# Patient Record
Sex: FEMALE | Race: ASIAN | NOT HISPANIC OR LATINO | Employment: FULL TIME | URBAN - METROPOLITAN AREA
[De-identification: names, ages, dates, MRNs, and addresses within clinical notes are randomized per-mention and may not be internally consistent; named-entity substitution may affect disease eponyms.]

---

## 2017-01-20 ENCOUNTER — HOSPITAL ENCOUNTER (EMERGENCY)
Facility: CLINIC | Age: 42
Discharge: HOME | End: 2017-01-20
Attending: EMERGENCY MEDICINE | Admitting: EMERGENCY MEDICINE
Payer: COMMERCIAL

## 2017-01-20 ENCOUNTER — APPOINTMENT (OUTPATIENT)
Dept: GENERAL RADIOLOGY | Facility: CLINIC | Age: 42
End: 2017-01-20
Attending: EMERGENCY MEDICINE
Payer: COMMERCIAL

## 2017-01-20 VITALS
TEMPERATURE: 97.8 F | RESPIRATION RATE: 16 BRPM | OXYGEN SATURATION: 100 % | DIASTOLIC BLOOD PRESSURE: 84 MMHG | HEART RATE: 100 BPM | SYSTOLIC BLOOD PRESSURE: 122 MMHG

## 2017-01-20 DIAGNOSIS — S20.219A CHEST WALL CONTUSION, UNSPECIFIED LATERALITY, INITIAL ENCOUNTER: ICD-10-CM

## 2017-01-20 DIAGNOSIS — S80.11XA CONTUSION OF RIGHT LOWER LEG, INITIAL ENCOUNTER: ICD-10-CM

## 2017-01-20 DIAGNOSIS — R07.89 CHEST WALL PAIN: ICD-10-CM
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
## 2017-01-20 PROCEDURE — 71020 XR CHEST 2 VW: CPT

## 2017-01-20 PROCEDURE — 99284 EMERGENCY DEPT VISIT MOD MDM: CPT | Mod: 25

## 2017-01-20 PROCEDURE — 25000132 ZZH RX MED GY IP 250 OP 250 PS 637: Performed by: EMERGENCY MEDICINE

## 2017-01-20 PROCEDURE — 93005 ELECTROCARDIOGRAM TRACING: CPT

## 2017-01-20 RX ORDER — IBUPROFEN 200 MG
600 TABLET ORAL EVERY 8 HOURS PRN
Qty: 1 TABLET | Refills: 0 | Status: ON HOLD | OUTPATIENT
Start: 2017-01-20 | End: 2019-09-04

## 2017-01-20 RX ORDER — OXYCODONE HYDROCHLORIDE 5 MG/1
5 TABLET ORAL ONCE
Status: COMPLETED | OUTPATIENT
Start: 2017-01-20 | End: 2017-01-20

## 2017-01-20 RX ORDER — ACETAMINOPHEN 500 MG
1000 TABLET ORAL ONCE
Status: COMPLETED | OUTPATIENT
Start: 2017-01-20 | End: 2017-01-20

## 2017-01-20 RX ORDER — HYDROCODONE BITARTRATE AND ACETAMINOPHEN 5; 325 MG/1; MG/1
1-2 TABLET ORAL EVERY 4 HOURS PRN
Qty: 15 TABLET | Refills: 0 | Status: ON HOLD | OUTPATIENT
Start: 2017-01-20 | End: 2019-09-04

## 2017-01-20 RX ADMIN — OXYCODONE HYDROCHLORIDE 5 MG: 5 TABLET ORAL at 17:49

## 2017-01-20 RX ADMIN — ACETAMINOPHEN 1000 MG: 500 TABLET, FILM COATED ORAL at 17:49

## 2017-01-20 ASSESSMENT — ENCOUNTER SYMPTOMS
SHORTNESS OF BREATH: 0
ABDOMINAL PAIN: 0
NECK PAIN: 0
HEADACHES: 0
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
## 2017-01-20 NOTE — ED AVS SNAPSHOT
[facility] Emergency Department    [address]    [facility] [address]    Phone:  [phone]    Fax:  [phone]                                       Cheryl Lester   MRN: [id number]    Department:  [facility] Emergency Department   Date of Visit:  1/20/2017           After Visit Summary Signature Page     I have received my discharge instructions, and my questions have been answered. I have discussed any challenges I see with this plan with the nurse or doctor.    ..........................................................................................................................................  Patient/Patient Representative Signature      ..........................................................................................................................................  Patient Representative Print Name and Relationship to Patient    ..................................................               ................................................  Date                                            Time    ..........................................................................................................................................  Reviewed by Signature/Title    ...................................................              ..............................................  Date                                                            Time

## 2017-01-21 NOTE — DISCHARGE INSTRUCTIONS
Chest Contusion    A contusion is a bruise to the skin, muscle, or ribs. It may cause pain, tenderness, and swelling. It may turn the skin purple until it heals. Contusions take a few days to a few weeks to heal.  Home care  Follow these guidelines when caring for yourself at home:    Rest. Don t do any heavy lifting or strenuous activity. Don t do any activity that causes pain.    Put an ice pack on the injured area. Do this for 20 minutes every 1 to 2 hours the first day. You can make an ice pack by wrapping a plastic bag of ice cubes in a thin towel. Continue to use the ice pack 3 to 4 times a day for the next 2 days. Then use the ice pack as needed to ease pain and swelling.    After 1 to 2 days you may put a warm compress on the area. Do this for 10 minutes several times a day. A warm compress is a clean cloth that s damp with warm water.    Hold a pillow to the affected area when you cough. This will help ease pain.    You may use acetaminophen or ibuprofen to control pain, unless another pain medicine was prescribed. If you have chronic liver or kidney disease, talk with your health care provider before using these medicines. Also talk with your provider if you ve had a stomach ulcer or GI bleeding.  Follow-up care  Follow up with your health care provider during the next week, or as advised.  When to seek medical advice  Call your health care provider right away if any of these occur:    Shortness of breath, difficulty breathing, or breathing fast    Chest pain gets worse when you breathe    Severe pain that comes on suddenly or lasts more than an hour    Dizziness, weakness, or fainting    New abdominal pain or abdominal pain that gets worse     Fever of 101 F (38.3 C) or higher, or as directed by your health care provider    2871-6534 [employer]. [address] [place] [address], [place] [address]. All rights reserved. This information is not intended as a substitute for professional medical care.  Always follow your healthcare professional's instructions.    Opioid Medication Discharge Instructions    You have been given a prescription for an opioid (narcotic) pain medicine and/or have   received a pain medicine while here in the emergency department. These medicines can make you drowsy or impaired.     You must not drive, operate dangerous equipment, or   engage in any other dangerous activities while taking these medications. If you drive while taking these medications, you could be arrested for DUI, or driving under the   influence. Do not drink any alcohol while you are taking these medications.     Opioid pain medications can cause addiction. If you have a history of chemical   dependency of any type, you are at a higher risk of becoming addicted to pain   medications. Only take these prescribed medications to treat your pain when all other   options have been tried. Take it for as short a time and as few doses as possible.     Store your pain pills in a secure place, as they are frequently stolen and provide a dangerous opportunity for children or visitors in your house to start abusing these powerful medications. We will not replace any lost or stolen medicine.     As soon as your pain is better, you should safely dispose of all your remaining medication.     Many prescription pain medications contain Tylenol (acetaminophen), including Vicodin, Tylenol #3, Norco, Lortab, and Percocet. You should not take any extra pills of Tylenol if you are using these prescription medications or you can get very sick. Do not ever take more than 4000 mg of acetaminophen in any 24 hour period.    All opioids tend to cause constipation. Drink plenty of water and eat foods that have   a lot of fiber, such as fruits, vegetables, prune juice, apple juice and high fiber cereal.   Take a laxative if you don t move your bowels at least every other day. Miralax, Milk of   Magnesia, Colace, or Senna can be used to keep you  regular.    Discharge Instructions  Trauma    You were seen today for an injury due to some kind of trauma (crash, fall, etc.).  Some injuries may not show up until after you leave the Emergency Department.  It is important that you pay attention to these instructions and follow-up with your regular doctor as instructed.    Return to the Emergency Department right away if:    You have abdominal pain or bruises, chest pain, pain in a new area, or pain that is getting worse.    You get short of breath.    You develop a fever over 101 degrees.    You have weakness in your arms or legs.    You faint or you are very lightheaded.    You have any new symptoms, you are feeling weak or unusually ill, or something worries you.     Injuries to the brain are possible with any accident.  Return right away if you have confusion, vomiting more than once, difficulty walking or a headache that is getting worse. Bring a child or a person who can t talk back if they seem to be behaving in an abnormal way.      MORE INFORMATION:    General Injuries:    Aches and pains are usually worse the day after your accident, but should not be severe, and should start getting better after that. Aches and pains are common in the neck and back.    Injuries from your accident may prevent you from working.  Follow-up with your regular doctor to get a work note and to find out how long you will not be able to work.    Pain medications or your injuries may make it unsafe for you to drive or operate machinery.    Use ice to injured areas for the first one or two days. Apply a bag of ice wrapped in a cloth for about 15 minutes at a time. You can do this as often as once an hour. Do not sleep with an ice pack, since it can burn you.     You can use non-prescription pain medicine, like Tylenol  (acetaminophen), Advil  (ibuprofen), Motrin  (ibuprofen), Nuprin  (ibuprofen) if your emergency doctor or your own doctor told you this is okay. Tylenol   (acetaminophen) is in many prescription medicines and non-prescription medicines--check all of your medicines to be sure you aren t taking more than 3000 mg per day.    Limit your activity for at least one or two days. Avoid doing things that hurt.    You need to see your doctor if any injured area is not back to normal in 1 week.    Car Accident:    If you have been on a backboard or had a neck collar on, this may make you stiff and sore.  This should get better in 1-2 days.  Return to the Emergency Department if the pain or discomfort is severe or gets worse.    Be careful of shards of glass on your body or in your belongings.    Fractures, Sprains, and Strains:    Return to the Emergency Department right away if your injured area gets more painful, if the splint or dressing seems to be too tight, if it gets numb or tingly past the injury, or if the area past the injury gets pale, blue, or cold.    Use your crutches if you were given them today. Don t put weight on the injured area until the pain is gone.    Keep the injured area above the level of your heart while laying or sitting down.  This well help lessen the swelling (puffiness) and the pain.    You may use an elastic bandage (Ace  Wrap) if it makes you more comfortable. Wrap it just tight enough to provide mild compression, and loosen it if you get swelling past the bandage.    Note about X-rays: If you had x-rays done today, they were read by your emergency physician. They will also be read later by a radiologist. We will contact you if the radiologist thinks they show something different than the emergency physician did. Remember that there are some fractures (breaks in the bone) that can t be seen right away. Even if your x-rays today were normal, you must see your doctor in clinic to re-check.     Splints:    A splint put on in the Emergency Department is temporary. Your regular doctor or orthopedic doctor will remove it, and replace it with a cast or  boot if needed.    Keep the splint dry. Cover it with a plastic bag when you wash. Even with a plastic bag, you still can t get in water or let water get right on it. If it does get wet, you should come back or see your doctor to have it replaced.    Do not put objects inside the splint to scratch.    If there is an elastic bandage (Ace  Wrap) holding the splint on this may be loosened a little to relieve pressure or pain.  If pain continues return to the Emergency Department right away.    Return if the splint starts cutting into your skin.    Do not remove your splint by yourself unless told to by your doctor. You can t take it off and put it back on again.     Wounds:    Infections can follow many injuries. Watch for fevers, redness spreading from the wound, pus or stitches that open up. Return here or see your doctor if these happen.    There can always be glass, wood, dirt or other things in any wound.  They won t always show up even on x-rays.  If a wound doesn t heal, this may be why, and it is important to follow-up with your regular doctor. Small pieces of glass or other materials may work their way out on their own.    Cuts or scrapes may start to bleed after leaving the Emergency Department.  If this happens, hold pressure on the bleeding area with a clean cloth or put pressure over the bandage.  If the bleeding doesn t stop after you use constant pressure for   hour, you should return to the Emergency Department for further treatment.    Any bandage or dressing put on here should be removed in 12-24 hours, or as your doctor instructs. Remove the dressing sooner if it seems too tight or painful, or if it is getting numb, tingly, or pale past the dressing.    After you take off the dressing, wash the cut or scrape with soap and water once or twice a day.    Apply ointment like Bacitracin  (polypeptide antibiotic) to scrapes or cuts, and keep them covered with a Band-Aid  or gauze if possible, until they  heal up or until your stitches are taken out.    Dermabond  or Steri-Strips  should be left alone and will come off by themselves.  Dissolving stitches should go away or fall out within about a week.    Regular stitches need to be taken out by your doctor in clinic.  Call today and schedule an appointment.  Leave your stitches in for as long as you were told today.    Most injuries are preventable!  As your local emergency physicians, we encourage you to:    Wear your seat belt.    Do not talk on your cell phone while driving.    Do not read or send text messages while driving.    Wear a bike or motorcycle helmet.    Wear a helmet while skiing and snowboarding.    Wear personal flotation devices at all times while on the water.    Always have your child in a car seat.    Do not allow children less than 12 years old to ride in the front seat.    Go to the CDC website to find more information on preventing injures:  http://www.cdc.gov/injury/index.html    If you were given a prescription for medicine here today, be sure to read all of the information (including the package insert) that comes with your prescription.  This will include important information about the medicine, its side effects, and any warnings that you need to know about.  The pharmacist who fills the prescription can provide more information and answer questions you may have about the medicine.  If you have questions or concerns that the pharmacist cannot address, please call or return to the Emergency Department.     Opioid Medication Information    Pain medications are among the most commonly prescribed medicines, so we are including this information for all our patients. If you did not receive pain medication or get a prescription for pain medicine, you can ignore it.     You may have been given a prescription for an opioid (narcotic) pain medicine and/or have received a pain medicine while here in the Emergency Department. These medicines can make  you drowsy or impaired. You must not drive, operate dangerous equipment, or engage in any other dangerous activities while taking these medications. If you drive while taking these medications, you could be arrested for DUI, or driving under the influence. Do not drink any alcohol while you are taking these medications.     Opioid pain medications can cause addiction. If you have a history of chemical dependency of any type, you are at a higher risk of becoming addicted to pain medications.  Only take these prescribed medications to treat your pain when all other options have been tried. Take it for as short a time and as few doses as possible. Store your pain pills in a secure place, as they are frequently stolen and provide a dangerous opportunity for children or visitors in your house to start abusing these powerful medications. We will not replace any lost or stolen medicine.  As soon as your pain is better, you should flush all your remaining medication.     Many prescription pain medications contain Tylenol  (acetaminophen), including Vicodin , Tylenol #3 , Norco , Lortab , and Percocet .  You should not take any extra pills of Tylenol  if you are using these prescription medications or you can get very sick.  Do not ever take more than 3000 mg of acetaminophen in any 24 hour period.    All opioids tend to cause constipation. Drink plenty of water and eat foods that have a lot of fiber, such as fruits, vegetables, prune juice, apple juice and high fiber cereal.  Take a laxative if you don t move your bowels at least every other day. Miralax , Milk of Magnesia, Colace , or Senna  can be used to keep you regular.      Remember that you can always come back to the Emergency Department if you are not able to see your regular doctor in the amount of time listed above, if you get any new symptoms, or if there is anything that worries you.

## 2017-01-23 LAB — INTERPRETATION ECG - MUSE: NORMAL

## 2019-05-07 ENCOUNTER — TRANSFERRED RECORDS (OUTPATIENT)
Dept: HEALTH INFORMATION MANAGEMENT | Facility: CLINIC | Age: 44
End: 2019-05-07

## 2019-07-31 ENCOUNTER — TRANSFERRED RECORDS (OUTPATIENT)
Dept: HEALTH INFORMATION MANAGEMENT | Facility: CLINIC | Age: 44
End: 2019-07-31

## 2019-09-03 ENCOUNTER — HOSPITAL ENCOUNTER (OUTPATIENT)
Facility: CLINIC | Age: 44
Discharge: HOME | End: 2019-09-04
Attending: OBSTETRICS & GYNECOLOGY | Admitting: OBSTETRICS & GYNECOLOGY
Payer: COMMERCIAL

## 2019-09-03 ENCOUNTER — OFFICE VISIT (OUTPATIENT)
Dept: INTERPRETER SERVICES | Facility: CLINIC | Age: 44
End: 2019-09-03
Payer: COMMERCIAL

## 2019-09-03 ENCOUNTER — ANESTHESIA (OUTPATIENT)
Dept: SURGERY | Facility: CLINIC | Age: 44
End: 2019-09-03
Payer: COMMERCIAL

## 2019-09-03 ENCOUNTER — ANESTHESIA EVENT (OUTPATIENT)
Dept: SURGERY | Facility: CLINIC | Age: 44
End: 2019-09-03
Payer: COMMERCIAL

## 2019-09-03 DIAGNOSIS — Z90.79 STATUS POST ABDOMINAL HYSTERECTOMY AND RIGHT SALPINGO-OOPHORECTOMY: Primary | ICD-10-CM

## 2019-09-03 DIAGNOSIS — Z90.721 STATUS POST ABDOMINAL HYSTERECTOMY AND RIGHT SALPINGO-OOPHORECTOMY: Primary | ICD-10-CM

## 2019-09-03 DIAGNOSIS — Z90.710 STATUS POST ABDOMINAL HYSTERECTOMY AND RIGHT SALPINGO-OOPHORECTOMY: Primary | ICD-10-CM

## 2019-09-03 PROBLEM — R52 PAIN: Status: ACTIVE | Noted: 2019-09-03

## 2019-09-03 LAB — HCG UR QL: NEGATIVE

## 2019-09-03 PROCEDURE — 00000155 ZZHCL STATISTIC H-CELL BLOCK W/STAIN: Performed by: OBSTETRICS & GYNECOLOGY

## 2019-09-03 PROCEDURE — 71000013 ZZH RECOVERY PHASE 1 LEVEL 1 EA ADDTL HR: Performed by: OBSTETRICS & GYNECOLOGY

## 2019-09-03 PROCEDURE — 88305 TISSUE EXAM BY PATHOLOGIST: CPT | Performed by: OBSTETRICS & GYNECOLOGY

## 2019-09-03 PROCEDURE — 25000125 ZZHC RX 250: Performed by: NURSE ANESTHETIST, CERTIFIED REGISTERED

## 2019-09-03 PROCEDURE — 88307 TISSUE EXAM BY PATHOLOGIST: CPT | Performed by: OBSTETRICS & GYNECOLOGY

## 2019-09-03 PROCEDURE — 37000008 ZZH ANESTHESIA TECHNICAL FEE, 1ST 30 MIN: Performed by: OBSTETRICS & GYNECOLOGY

## 2019-09-03 PROCEDURE — 88112 CYTOPATH CELL ENHANCE TECH: CPT | Performed by: OBSTETRICS & GYNECOLOGY

## 2019-09-03 PROCEDURE — 25800030 ZZH RX IP 258 OP 636: Performed by: ANESTHESIOLOGY

## 2019-09-03 PROCEDURE — 88112 CYTOPATH CELL ENHANCE TECH: CPT | Mod: 26 | Performed by: OBSTETRICS & GYNECOLOGY

## 2019-09-03 PROCEDURE — 25000132 ZZH RX MED GY IP 250 OP 250 PS 637: Performed by: ANESTHESIOLOGY

## 2019-09-03 PROCEDURE — 88307 TISSUE EXAM BY PATHOLOGIST: CPT | Mod: 26 | Performed by: OBSTETRICS & GYNECOLOGY

## 2019-09-03 PROCEDURE — 25000125 ZZHC RX 250: Performed by: OBSTETRICS & GYNECOLOGY

## 2019-09-03 PROCEDURE — 81025 URINE PREGNANCY TEST: CPT | Performed by: ANESTHESIOLOGY

## 2019-09-03 PROCEDURE — 40000306 ZZH STATISTIC PRE PROC ASSESS II: Performed by: OBSTETRICS & GYNECOLOGY

## 2019-09-03 PROCEDURE — 37000009 ZZH ANESTHESIA TECHNICAL FEE, EACH ADDTL 15 MIN: Performed by: OBSTETRICS & GYNECOLOGY

## 2019-09-03 PROCEDURE — 71000012 ZZH RECOVERY PHASE 1 LEVEL 1 FIRST HR: Performed by: OBSTETRICS & GYNECOLOGY

## 2019-09-03 PROCEDURE — 27210794 ZZH OR GENERAL SUPPLY STERILE: Performed by: OBSTETRICS & GYNECOLOGY

## 2019-09-03 PROCEDURE — 25000128 H RX IP 250 OP 636: Performed by: NURSE ANESTHETIST, CERTIFIED REGISTERED

## 2019-09-03 PROCEDURE — 88305 TISSUE EXAM BY PATHOLOGIST: CPT | Mod: 26 | Performed by: OBSTETRICS & GYNECOLOGY

## 2019-09-03 PROCEDURE — 25000128 H RX IP 250 OP 636: Performed by: OBSTETRICS & GYNECOLOGY

## 2019-09-03 PROCEDURE — 36000063 ZZH SURGERY LEVEL 4 EA 15 ADDTL MIN: Performed by: OBSTETRICS & GYNECOLOGY

## 2019-09-03 PROCEDURE — 36000093 ZZH SURGERY LEVEL 4 1ST 30 MIN: Performed by: OBSTETRICS & GYNECOLOGY

## 2019-09-03 PROCEDURE — 25000128 H RX IP 250 OP 636: Performed by: ANESTHESIOLOGY

## 2019-09-03 PROCEDURE — 25800025 ZZH RX 258: Performed by: OBSTETRICS & GYNECOLOGY

## 2019-09-03 PROCEDURE — 25800030 ZZH RX IP 258 OP 636: Performed by: NURSE ANESTHETIST, CERTIFIED REGISTERED

## 2019-09-03 PROCEDURE — 25800030 ZZH RX IP 258 OP 636: Performed by: OBSTETRICS & GYNECOLOGY

## 2019-09-03 PROCEDURE — 25000125 ZZHC RX 250: Performed by: ANESTHESIOLOGY

## 2019-09-03 PROCEDURE — T1013 SIGN LANG/ORAL INTERPRETER: HCPCS | Mod: U3

## 2019-09-03 RX ORDER — ONDANSETRON 4 MG/1
4 TABLET, ORALLY DISINTEGRATING ORAL EVERY 6 HOURS PRN
Status: DISCONTINUED | OUTPATIENT
Start: 2019-09-03 | End: 2019-09-04 | Stop reason: HOSPADM

## 2019-09-03 RX ORDER — ACETAMINOPHEN 325 MG/1
650 TABLET ORAL EVERY 6 HOURS PRN
Status: DISCONTINUED | OUTPATIENT
Start: 2019-09-03 | End: 2019-09-04 | Stop reason: HOSPADM

## 2019-09-03 RX ORDER — METOCLOPRAMIDE HYDROCHLORIDE 5 MG/ML
10 INJECTION INTRAMUSCULAR; INTRAVENOUS EVERY 6 HOURS PRN
Status: DISCONTINUED | OUTPATIENT
Start: 2019-09-03 | End: 2019-09-04 | Stop reason: HOSPADM

## 2019-09-03 RX ORDER — MEPERIDINE HYDROCHLORIDE 25 MG/ML
12.5 INJECTION INTRAMUSCULAR; INTRAVENOUS; SUBCUTANEOUS
Status: DISCONTINUED | OUTPATIENT
Start: 2019-09-03 | End: 2019-09-03 | Stop reason: HOSPADM

## 2019-09-03 RX ORDER — HYDROMORPHONE HYDROCHLORIDE 1 MG/ML
.3-.5 INJECTION, SOLUTION INTRAMUSCULAR; INTRAVENOUS; SUBCUTANEOUS
Status: DISCONTINUED | OUTPATIENT
Start: 2019-09-03 | End: 2019-09-04 | Stop reason: HOSPADM

## 2019-09-03 RX ORDER — ACETAMINOPHEN 325 MG/1
975 TABLET ORAL ONCE
Status: COMPLETED | OUTPATIENT
Start: 2019-09-03 | End: 2019-09-03

## 2019-09-03 RX ORDER — FENTANYL CITRATE 50 UG/ML
25-50 INJECTION, SOLUTION INTRAMUSCULAR; INTRAVENOUS EVERY 5 MIN PRN
Status: DISCONTINUED | OUTPATIENT
Start: 2019-09-03 | End: 2019-09-03 | Stop reason: HOSPADM

## 2019-09-03 RX ORDER — NALOXONE HYDROCHLORIDE 0.4 MG/ML
.1-.4 INJECTION, SOLUTION INTRAMUSCULAR; INTRAVENOUS; SUBCUTANEOUS
Status: DISCONTINUED | OUTPATIENT
Start: 2019-09-03 | End: 2019-09-03 | Stop reason: HOSPADM

## 2019-09-03 RX ORDER — IBUPROFEN 600 MG/1
600 TABLET, FILM COATED ORAL EVERY 6 HOURS PRN
Status: DISCONTINUED | OUTPATIENT
Start: 2019-09-04 | End: 2019-09-04 | Stop reason: HOSPADM

## 2019-09-03 RX ORDER — LIDOCAINE 40 MG/G
CREAM TOPICAL
Status: DISCONTINUED | OUTPATIENT
Start: 2019-09-03 | End: 2019-09-03 | Stop reason: HOSPADM

## 2019-09-03 RX ORDER — CEFAZOLIN SODIUM 1 G/3ML
1 INJECTION, POWDER, FOR SOLUTION INTRAMUSCULAR; INTRAVENOUS SEE ADMIN INSTRUCTIONS
Status: DISCONTINUED | OUTPATIENT
Start: 2019-09-03 | End: 2019-09-03 | Stop reason: HOSPADM

## 2019-09-03 RX ORDER — ONDANSETRON 2 MG/ML
4 INJECTION INTRAMUSCULAR; INTRAVENOUS EVERY 6 HOURS PRN
Status: DISCONTINUED | OUTPATIENT
Start: 2019-09-03 | End: 2019-09-04 | Stop reason: HOSPADM

## 2019-09-03 RX ORDER — PROPOFOL 10 MG/ML
INJECTION, EMULSION INTRAVENOUS PRN
Status: DISCONTINUED | OUTPATIENT
Start: 2019-09-03 | End: 2019-09-03

## 2019-09-03 RX ORDER — CELECOXIB 200 MG/1
200 CAPSULE ORAL ONCE
Status: COMPLETED | OUTPATIENT
Start: 2019-09-03 | End: 2019-09-03

## 2019-09-03 RX ORDER — KETOROLAC TROMETHAMINE 30 MG/ML
30 INJECTION, SOLUTION INTRAMUSCULAR; INTRAVENOUS EVERY 6 HOURS
Status: DISCONTINUED | OUTPATIENT
Start: 2019-09-03 | End: 2019-09-04 | Stop reason: HOSPADM

## 2019-09-03 RX ORDER — OXYCODONE HYDROCHLORIDE 5 MG/1
5-10 TABLET ORAL
Status: DISCONTINUED | OUTPATIENT
Start: 2019-09-03 | End: 2019-09-04 | Stop reason: HOSPADM

## 2019-09-03 RX ORDER — GLYCOPYRROLATE 0.2 MG/ML
INJECTION, SOLUTION INTRAMUSCULAR; INTRAVENOUS PRN
Status: DISCONTINUED | OUTPATIENT
Start: 2019-09-03 | End: 2019-09-03

## 2019-09-03 RX ORDER — EPHEDRINE SULFATE 50 MG/ML
25 INJECTION, SOLUTION INTRAVENOUS ONCE
Status: COMPLETED | OUTPATIENT
Start: 2019-09-03 | End: 2019-09-03

## 2019-09-03 RX ORDER — SODIUM CHLORIDE, SODIUM LACTATE, POTASSIUM CHLORIDE, CALCIUM CHLORIDE 600; 310; 30; 20 MG/100ML; MG/100ML; MG/100ML; MG/100ML
INJECTION, SOLUTION INTRAVENOUS CONTINUOUS
Status: DISCONTINUED | OUTPATIENT
Start: 2019-09-03 | End: 2019-09-04 | Stop reason: HOSPADM

## 2019-09-03 RX ORDER — ONDANSETRON 4 MG/1
4 TABLET, ORALLY DISINTEGRATING ORAL EVERY 30 MIN PRN
Status: DISCONTINUED | OUTPATIENT
Start: 2019-09-03 | End: 2019-09-03 | Stop reason: HOSPADM

## 2019-09-03 RX ORDER — NEOSTIGMINE METHYLSULFATE 1 MG/ML
VIAL (ML) INJECTION PRN
Status: DISCONTINUED | OUTPATIENT
Start: 2019-09-03 | End: 2019-09-03

## 2019-09-03 RX ORDER — SODIUM CHLORIDE, SODIUM LACTATE, POTASSIUM CHLORIDE, CALCIUM CHLORIDE 600; 310; 30; 20 MG/100ML; MG/100ML; MG/100ML; MG/100ML
INJECTION, SOLUTION INTRAVENOUS CONTINUOUS
Status: DISCONTINUED | OUTPATIENT
Start: 2019-09-03 | End: 2019-09-03 | Stop reason: HOSPADM

## 2019-09-03 RX ORDER — NALOXONE HYDROCHLORIDE 0.4 MG/ML
.1-.4 INJECTION, SOLUTION INTRAMUSCULAR; INTRAVENOUS; SUBCUTANEOUS
Status: DISCONTINUED | OUTPATIENT
Start: 2019-09-03 | End: 2019-09-04 | Stop reason: HOSPADM

## 2019-09-03 RX ORDER — PROMETHAZINE HYDROCHLORIDE 25 MG/ML
25 INJECTION INTRAMUSCULAR; INTRAVENOUS ONCE
Status: COMPLETED | OUTPATIENT
Start: 2019-09-03 | End: 2019-09-03

## 2019-09-03 RX ORDER — EPHEDRINE SULFATE 50 MG/ML
INJECTION, SOLUTION INTRAMUSCULAR; INTRAVENOUS; SUBCUTANEOUS PRN
Status: DISCONTINUED | OUTPATIENT
Start: 2019-09-03 | End: 2019-09-03

## 2019-09-03 RX ORDER — BUPIVACAINE HYDROCHLORIDE AND EPINEPHRINE 2.5; 5 MG/ML; UG/ML
INJECTION, SOLUTION EPIDURAL; INFILTRATION; INTRACAUDAL; PERINEURAL PRN
Status: DISCONTINUED | OUTPATIENT
Start: 2019-09-03 | End: 2019-09-03 | Stop reason: HOSPADM

## 2019-09-03 RX ORDER — CEFAZOLIN SODIUM 2 G/100ML
2 INJECTION, SOLUTION INTRAVENOUS
Status: COMPLETED | OUTPATIENT
Start: 2019-09-03 | End: 2019-09-03

## 2019-09-03 RX ORDER — KETAMINE HYDROCHLORIDE 10 MG/ML
INJECTION INTRAMUSCULAR; INTRAVENOUS PRN
Status: DISCONTINUED | OUTPATIENT
Start: 2019-09-03 | End: 2019-09-03

## 2019-09-03 RX ORDER — ONDANSETRON 2 MG/ML
4 INJECTION INTRAMUSCULAR; INTRAVENOUS EVERY 30 MIN PRN
Status: DISCONTINUED | OUTPATIENT
Start: 2019-09-03 | End: 2019-09-03 | Stop reason: HOSPADM

## 2019-09-03 RX ORDER — ONDANSETRON 2 MG/ML
INJECTION INTRAMUSCULAR; INTRAVENOUS PRN
Status: DISCONTINUED | OUTPATIENT
Start: 2019-09-03 | End: 2019-09-03

## 2019-09-03 RX ORDER — LIDOCAINE HYDROCHLORIDE 10 MG/ML
INJECTION, SOLUTION INFILTRATION; PERINEURAL PRN
Status: DISCONTINUED | OUTPATIENT
Start: 2019-09-03 | End: 2019-09-03

## 2019-09-03 RX ORDER — FENTANYL CITRATE 50 UG/ML
INJECTION, SOLUTION INTRAMUSCULAR; INTRAVENOUS PRN
Status: DISCONTINUED | OUTPATIENT
Start: 2019-09-03 | End: 2019-09-03

## 2019-09-03 RX ORDER — DEXAMETHASONE SODIUM PHOSPHATE 4 MG/ML
INJECTION, SOLUTION INTRA-ARTICULAR; INTRALESIONAL; INTRAMUSCULAR; INTRAVENOUS; SOFT TISSUE PRN
Status: DISCONTINUED | OUTPATIENT
Start: 2019-09-03 | End: 2019-09-03

## 2019-09-03 RX ORDER — LIDOCAINE 40 MG/G
CREAM TOPICAL
Status: DISCONTINUED | OUTPATIENT
Start: 2019-09-03 | End: 2019-09-04 | Stop reason: HOSPADM

## 2019-09-03 RX ORDER — METOCLOPRAMIDE 5 MG/1
10 TABLET ORAL EVERY 6 HOURS PRN
Status: DISCONTINUED | OUTPATIENT
Start: 2019-09-03 | End: 2019-09-04 | Stop reason: HOSPADM

## 2019-09-03 RX ORDER — MINERAL OIL
OIL (ML) MISCELLANEOUS PRN
Status: DISCONTINUED | OUTPATIENT
Start: 2019-09-03 | End: 2019-09-03 | Stop reason: HOSPADM

## 2019-09-03 RX ORDER — FENTANYL CITRATE 50 UG/ML
25-50 INJECTION, SOLUTION INTRAMUSCULAR; INTRAVENOUS
Status: DISCONTINUED | OUTPATIENT
Start: 2019-09-03 | End: 2019-09-03 | Stop reason: HOSPADM

## 2019-09-03 RX ADMIN — CELECOXIB 200 MG: 200 CAPSULE ORAL at 07:10

## 2019-09-03 RX ADMIN — Medication 30 MG: at 07:41

## 2019-09-03 RX ADMIN — MIDAZOLAM 2 MG: 1 INJECTION INTRAMUSCULAR; INTRAVENOUS at 07:35

## 2019-09-03 RX ADMIN — Medication 10 MG: at 10:01

## 2019-09-03 RX ADMIN — ROCURONIUM BROMIDE 20 MG: 10 INJECTION INTRAVENOUS at 09:34

## 2019-09-03 RX ADMIN — FENTANYL CITRATE 50 MCG: 50 INJECTION, SOLUTION INTRAMUSCULAR; INTRAVENOUS at 10:54

## 2019-09-03 RX ADMIN — ONDANSETRON 4 MG: 2 INJECTION INTRAMUSCULAR; INTRAVENOUS at 11:31

## 2019-09-03 RX ADMIN — ROCURONIUM BROMIDE 10 MG: 10 INJECTION INTRAVENOUS at 08:01

## 2019-09-03 RX ADMIN — EPHEDRINE SULFATE 25 MG: 50 INJECTION, SOLUTION INTRAVENOUS at 12:07

## 2019-09-03 RX ADMIN — CEFAZOLIN 1 G: 1 INJECTION, POWDER, FOR SOLUTION INTRAMUSCULAR; INTRAVENOUS at 09:45

## 2019-09-03 RX ADMIN — GLYCOPYRROLATE 0.6 MG: 0.2 INJECTION, SOLUTION INTRAMUSCULAR; INTRAVENOUS at 10:53

## 2019-09-03 RX ADMIN — DEXMEDETOMIDINE HYDROCHLORIDE 0.5 MCG/KG/HR: 100 INJECTION, SOLUTION INTRAVENOUS at 07:50

## 2019-09-03 RX ADMIN — Medication 10 MG: at 07:58

## 2019-09-03 RX ADMIN — KETOROLAC TROMETHAMINE 30 MG: 30 INJECTION, SOLUTION INTRAMUSCULAR at 20:26

## 2019-09-03 RX ADMIN — ROCURONIUM BROMIDE 20 MG: 10 INJECTION INTRAVENOUS at 08:58

## 2019-09-03 RX ADMIN — Medication 5 MG: at 09:21

## 2019-09-03 RX ADMIN — FENTANYL CITRATE 50 MCG: 50 INJECTION, SOLUTION INTRAMUSCULAR; INTRAVENOUS at 10:20

## 2019-09-03 RX ADMIN — FENTANYL CITRATE 50 MCG: 50 INJECTION, SOLUTION INTRAMUSCULAR; INTRAVENOUS at 07:43

## 2019-09-03 RX ADMIN — PROPOFOL 80 MG: 10 INJECTION, EMULSION INTRAVENOUS at 07:41

## 2019-09-03 RX ADMIN — SODIUM CHLORIDE, POTASSIUM CHLORIDE, SODIUM LACTATE AND CALCIUM CHLORIDE: 600; 310; 30; 20 INJECTION, SOLUTION INTRAVENOUS at 07:14

## 2019-09-03 RX ADMIN — PHENYLEPHRINE HYDROCHLORIDE 100 MCG: 10 INJECTION INTRAVENOUS at 10:00

## 2019-09-03 RX ADMIN — GLYCOPYRROLATE 0.2 MG: 0.2 INJECTION, SOLUTION INTRAMUSCULAR; INTRAVENOUS at 09:14

## 2019-09-03 RX ADMIN — Medication 10 MG: at 10:50

## 2019-09-03 RX ADMIN — FENTANYL CITRATE 25 MCG: 50 INJECTION INTRAMUSCULAR; INTRAVENOUS at 13:49

## 2019-09-03 RX ADMIN — LIDOCAINE HYDROCHLORIDE 50 MG: 10 INJECTION, SOLUTION INFILTRATION; PERINEURAL at 07:41

## 2019-09-03 RX ADMIN — CEFAZOLIN SODIUM 2 G: 2 INJECTION, SOLUTION INTRAVENOUS at 07:48

## 2019-09-03 RX ADMIN — SODIUM CHLORIDE, POTASSIUM CHLORIDE, SODIUM LACTATE AND CALCIUM CHLORIDE: 600; 310; 30; 20 INJECTION, SOLUTION INTRAVENOUS at 19:13

## 2019-09-03 RX ADMIN — FENTANYL CITRATE 100 MCG: 50 INJECTION, SOLUTION INTRAMUSCULAR; INTRAVENOUS at 07:41

## 2019-09-03 RX ADMIN — ONDANSETRON HYDROCHLORIDE 4 MG: 2 INJECTION, SOLUTION INTRAVENOUS at 10:42

## 2019-09-03 RX ADMIN — Medication 4 MG: at 10:53

## 2019-09-03 RX ADMIN — PROMETHAZINE HYDROCHLORIDE 25 MG: 25 INJECTION INTRAMUSCULAR; INTRAVENOUS at 12:08

## 2019-09-03 RX ADMIN — Medication 10 MG: at 08:55

## 2019-09-03 RX ADMIN — ROCURONIUM BROMIDE 40 MG: 10 INJECTION INTRAVENOUS at 07:42

## 2019-09-03 RX ADMIN — ACETAMINOPHEN 975 MG: 325 TABLET, FILM COATED ORAL at 07:10

## 2019-09-03 RX ADMIN — DEXAMETHASONE SODIUM PHOSPHATE 4 MG: 4 INJECTION, SOLUTION INTRA-ARTICULAR; INTRALESIONAL; INTRAMUSCULAR; INTRAVENOUS; SOFT TISSUE at 07:41

## 2019-09-03 RX ADMIN — ROCURONIUM BROMIDE 20 MG: 10 INJECTION INTRAVENOUS at 08:18

## 2019-09-03 RX ADMIN — HYDROMORPHONE HYDROCHLORIDE 0.3 MG: 1 INJECTION, SOLUTION INTRAMUSCULAR; INTRAVENOUS; SUBCUTANEOUS at 17:59

## 2019-09-03 RX ADMIN — ROCURONIUM BROMIDE 20 MG: 10 INJECTION INTRAVENOUS at 10:06

## 2019-09-03 RX ADMIN — FENTANYL CITRATE 25 MCG: 50 INJECTION INTRAMUSCULAR; INTRAVENOUS at 15:55

## 2019-09-03 ASSESSMENT — ACTIVITIES OF DAILY LIVING (ADL)
RETIRED_COMMUNICATION: 0-->UNDERSTANDS/COMMUNICATES WITHOUT DIFFICULTY
AMBULATION: 0-->INDEPENDENT
DRESS: 0-->INDEPENDENT
SWALLOWING: 0-->SWALLOWS FOODS/LIQUIDS WITHOUT DIFFICULTY
RETIRED_EATING: 0-->INDEPENDENT
BATHING: 0-->INDEPENDENT
TRANSFERRING: 0-->INDEPENDENT
FALL_HISTORY_WITHIN_LAST_SIX_MONTHS: NO
COGNITION: 0 - NO COGNITION ISSUES REPORTED
TOILETING: 0-->INDEPENDENT

## 2019-09-03 ASSESSMENT — ENCOUNTER SYMPTOMS
SEIZURES: 0
DYSRHYTHMIAS: 0

## 2019-09-03 ASSESSMENT — MIFFLIN-ST. JEOR: SCORE: 1456.47

## 2019-09-03 NOTE — ANESTHESIA CARE TRANSFER NOTE
Patient: Cheryl Lester    Procedure(s):  Laparoscopic single port  hysterectomy, bilateral salpingectomy, right oopherectomy with power morcellation in an isolation bag, apical colopexy    Diagnosis: Uterine fibroids  Diagnosis Additional Information: No value filed.    Anesthesia Type:   General, ETT     Note:  Airway :Face Mask  Patient transferred to:PACU  Comments: Neuromuscular blockade reversed after TOF 4/4, spontaneous respirations, adequate tidal volumes, followed commands to voice, oropharynx suctioned with soft flexible catheter, extubated atraumatically, extubated with suction, airway patent after extubation.  Oxygen via facemask at 6 liters per minute to PACU. Oxygen tubing connected to wall O2 in PACU, SpO2, NiBP, and EKG monitors and alarms on and functioning, report on patient's clinical status given to PACU RN. Handoff Report: Identifed the Patient, Identified the Reponsible Provider, Reviewed the pertinent medical history, Discussed the surgical course, Reviewed Intra-OP anesthesia mangement and issues during anesthesia, Set expectations for post-procedure period and Allowed opportunity for questions and acknowledgement of understanding      Vitals: (Last set prior to Anesthesia Care Transfer)    CRNA VITALS  9/3/2019 1032 - 9/3/2019 1108      9/3/2019             Pulse:  102    SpO2:  94 %    Resp Rate (observed):  8        pacu vs: 134/88-39-%-98F        Electronically Signed By: MARQUITA Maradiaga CRNA  September 3, 2019  11:08 AM

## 2019-09-03 NOTE — BRIEF OP NOTE
[facility] Brief Operative Note    Pre-operative diagnosis: Uterine fibroids   Post-operative diagnosis Same plus enterocele   Procedure: Procedure(s):  Laparoscopic single port  hysterectomy, bilateral salpingectomy, right oopherectomy with power morcellation in an isolation bag, apical colopexy pelvic washings   Surgeon(s): Surgeon(s) and Role:     * Lisa Walters MD - Primary is Debbie Walters Assist   Estimated blood loss: 200 mL    Specimens: ID Type Source Tests Collected by Time Destination   1 : pelvic washings Washings Pelvis CYTOLOGY NON GYN Lisa Walters MD 9/3/2019  8:10 AM    A : uterus,cervix, bilateral fallopian tubes, right ovary Tissue Uterus,  Bilateral Fallopian Tubes SURGICAL PATHOLOGY EXAM Lisa Walters MD 9/3/2019 10:40 AM       Findings:

## 2019-09-03 NOTE — PLAN OF CARE
Pt interpretor present.  Anup Laird Interpretor  [id number]  [employer] [facility]

## 2019-09-03 NOTE — PROVIDER NOTIFICATION
Patient complaining of eye pain, notified Anesthesiologist.     Anesthesiologist will come to assess patient.

## 2019-09-03 NOTE — PHARMACY-ADMISSION MEDICATION HISTORY
Admission medication history interview status for this patient was completed PTA by a preadmitting nurse (Ifeoma Grimaldo). See EPIC admission navigator for allergy information, prior to admission medications and immunization status.       Prior to Admission medications    Medication Sig Last Dose Taking? Auth Provider   ibuprofen (ADVIL/MOTRIN) 200 MG tablet Take 3 tablets (600 mg) by mouth every 8 hours as needed for mild pain Past Week at Unknown time Per 8/27: pt not taking  Celina Griffith MD   HYDROcodone-acetaminophen (NORCO) 5-325 MG per tablet Take 1-2 tablets by mouth every 4 hours as needed for moderate to severe pain Unknown at Unknown time  Celina Griffith MD

## 2019-09-03 NOTE — PROGRESS NOTES
Called to see pt in her room ref right eye pain postop. Discussed corneal abrasions in depth via  and offered pt toradol ophthalmic drops but she declined. Most of these events resolve w/o problems within 24-48 hrs. Pt to f/u with us IRINA Gresham

## 2019-09-03 NOTE — PROGRESS NOTES
ROOM # 202-2    [facility]if not independent, [facility]  [facility]:  : NA    Activity level at baseline: Independent  Activity level on admit: SBA      Patient registered to observation; given Patient Bill of Rights; given the opportunity to ask questions about observation status and their plan of care.  Patient has been oriented to the observation room, bathroom and call light is in place.    Discussed discharge goals and expectations with patient/family.       OBSERVATION patient IN TIME: 161

## 2019-09-03 NOTE — ANESTHESIA POSTPROCEDURE EVALUATION
Patient: Cheryl Lester    Procedure(s):  Laparoscopic single port  hysterectomy, bilateral salpingectomy, right oopherectomy with power morcellation in an isolation bag, apical colopexy    Diagnosis:Uterine fibroids  Diagnosis Additional Information: No value filed.    Anesthesia Type:  General, ETT    Note:  Anesthesia Post Evaluation    Patient location during evaluation: PACU  Patient participation: Able to fully participate in evaluation  Level of consciousness: awake and alert  Pain management: adequate  Airway patency: patent  Cardiovascular status: acceptable  Respiratory status: acceptable  Hydration status: acceptable  PONV: inadequately controlled (admistering additional antiemetics)             Last vitals:  Vitals:    09/03/19 1125 09/03/19 1130 09/03/19 1145   BP: 108/65 123/65 100/65   Pulse: 65 69 64   Resp: 18 13 19   Temp:      SpO2: 100% 100% 100%         Electronically Signed By: Kenroy Carrera MD  September 3, 2019  12:04 PM

## 2019-09-03 NOTE — PLAN OF CARE
PRIMARY DIAGNOSIS: Laparoscopic single port  hysterectomy, bilateral salpingectomy, right oopherectomy  OUTPATIENT/OBSERVATION GOALS TO BE MET BEFORE DISCHARGE:  1. Stable vital signs Yes  2. Tolerating diet:No  3. Pain controlled with oral pain medications:  No  4. Positive bowel sounds:  hypoactive   5. Voiding without difficulty:  Yes  6. Able to ambulate:  Yes  7. Provider specific discharge goals met:  No    Discharge Planner Nurse   Safe discharge environment identified: Yes  Barriers to discharge: Yes       Entered by: Shashi Bowers 09/03/2019 4:58 PM     Please review provider order for any additional goals.   Nurse to notify provider when observation goals have been met and patient is ready for discharge.    Patient arrived from PACU at 1615. Capnography on. Patient now on RA. Voided prior to transfer to floor. Patient very lethargic. Pain is currently under control. IVF infusing. Patient too sleepy to eat or drink. Transferred to bed with SBA.

## 2019-09-03 NOTE — DISCHARGE INSTRUCTIONS
GENERAL ANESTHESIA OR SEDATION ADULT DISCHARGE INSTRUCTIONS   SPECIAL PRECAUTIONS FOR 24 HOURS AFTER SURGERY    IT IS NOT UNUSUAL TO FEEL LIGHT-HEADED OR FAINT, UP TO 24 HOURS AFTER SURGERY OR WHILE TAKING PAIN MEDICATION.  IF YOU HAVE THESE SYMPTOMS; SIT FOR A FEW MINUTES BEFORE STANDING AND HAVE SOMEONE ASSIST YOU WHEN YOU GET UP TO WALK OR USE THE BATHROOM.    YOU SHOULD REST AND RELAX FOR THE NEXT 24 HOURS AND YOU MUST MAKE ARRANGEMENTS TO HAVE SOMEONE STAY WITH YOU FOR AT LEAST 24 HOURS AFTER YOUR DISCHARGE.  AVOID HAZARDOUS AND STRENUOUS ACTIVITIES.  DO NOT MAKE IMPORTANT DECISIONS FOR 24 HOURS.    DO NOT DRIVE ANY VEHICLE OR OPERATE MECHANICAL EQUIPMENT FOR 24 HOURS FOLLOWING THE END OF YOUR SURGERY.  EVEN THOUGH YOU MAY FEEL NORMAL, YOUR REACTIONS MAY BE AFFECTED BY THE MEDICATION YOU HAVE RECEIVED.    DO NOT DRINK ALCOHOLIC BEVERAGES FOR 24 HOURS FOLLOWING YOUR SURGERY.    DRINK CLEAR LIQUIDS (APPLE JUICE, GINGER ALE, 7-UP, BROTH, ETC.).  PROGRESS TO YOUR REGULAR DIET AS YOU FEEL ABLE.    YOU MAY HAVE A DRY MOUTH, A SORE THROAT, MUSCLES ACHES OR TROUBLE SLEEPING.  THESE SHOULD GO AWAY AFTER 24 HOURS.    CALL YOUR DOCTOR FOR ANY OF THE FOLLOWING:  SIGNS OF INFECTION (FEVER, GROWING TENDERNESS AT THE SURGERY SITE, A LARGE AMOUNT OF DRAINAGE OR BLEEDING, SEVERE PAIN, FOUL-SMELLING DRAINAGE, REDNESS OR SWELLING.    IT HAS BEEN OVER 8 TO 10 HOURS SINCE SURGERY AND YOU ARE STILL NOT ABLE TO URINATE (PASS WATER).     LAPAROSCOPIC HYSTERECTOMY DISCHARGE INSTRUCTIONS    PLEASE RETURN TO THE CLINIC IN:  ____2 WEEKS  ____4 WEEKS  ____6 WEEKS  MAKE THIS APPOINTMENT AFTER YOU GET HOME IF IT HAS NOT ALREADY BEEN SCHEDULED.     YOU HAVE HAD A HYSTERECTOMY (SURGERY TO REMOVE YOUR UTERUS).  YOU CANNOT HAVE CHILDREN AFTER THIS SURGERY.  YOU WILL NO LONGER HAVE MONTHLY PERIODS, UNLESS YOU STILL HAVE YOUR CERVIX (CALLED SUBTOTAL HYSTERECTOMY).  IN THIS CASE, YOU MAY HAVE LIGHT MONTHLY BLEEDING.    DIET  YOU MAY FEEL LESS  HUNGRY AT FIRST.  TRY TO EAT A WELL-BALANCED DIET WITH LOTS OF PROTEINS, FRUITS, VEGETABLES AND WHOLE GRAINS.  AVOID SPICY AND GREASY FOODS.    DRINK PLENTY OF FLUIDS--AT LEAST 8 TALL GLASSES A DAY.  WATER IS BEST.  TRY TO LIMIT CAFFEINE (FOUND IN COFFEE, TEA AND COLA DRINKS).  THIS HELPS PREVENT CONSTIPATION (HARD STOOLS THAT ARE DIFFICULT TO PASS).    IF CONSTIPATION IS A PROBLEM, YOU MAY TAKE ONE OF THESE MEDICINES:  DOCUSATE (COLACE), DOCUSATE WITH CASANTHRANOL (TERA-COLACE), PSYLLIUM (METAMUCIL) OR MILK OF MAGNESIA.  YOU CAN BUY THESE AT THE DRUG STORE.  FOLLOW THE INSTRUCTIONS LISTED ON THE LABEL.    ACTIVITY  YOU WILL NEED PLENTY OF REST AT FIRST.  SLOWLY GO BACK TO YOUR NORMAL ACTIVITIES.  IT WILL HELP TO TAKE SEVERAL SHORT WALKS DURING THE DAY.  IT IS OKAY TO CLIMB STAIRS, BUT USE THE HANDRAIL IN CASE YOU GET DIZZY.    DO NOT DRIVE WHILE USING STRONG PAIN MEDICINE (NARCOTICS).  AFTER THAT, DO NOT DRIVE UNTIL YOU CAN STOMP ON THE BRAKES WITHOUT PAIN.    DO NOT USE TAMPONS, DOUCHE OR HAVE SEX (INTERCOURSE) UNTIL YOU SEE YOUR DOCTOR AGAIN.    DON'T LIFT OR PUSH ANYTHING OVER 20 POUNDS UNTIL YOUR DOCTOR SAYS IT'S SAFE.  AVOID HEAVY OR STRENUOUS EXERCISE.    YOUR DOCTOR WILL TELL YOU WHEN YOU CAN SAFELY RETURN TO WORK.    PAIN CONTROL  YOU MAY HAVE PAIN AROUND YOUR INCISIONS (SURGERY WOUNDS).  THIS SHOULD IMPROVE OVER THE NEXT WEEK.  USE YOUR PAIN MEDICINE AS DIRECTED.  IF YOU HAVE INCREASED PAIN, PLEASE CALL YOUR CLINIC.  IT IS NORMAL TO FEEL A LITTLE SORE AFTER MILD EXERCISE.      FOR THE NEXT TWO DAYS, YOU MAY HAVE SOME PAIN IN YOUR SHOULDERS AND UPPER CHEST.  THIS IS FROM THE AIR PUMPED INTO YOUR BODY DURING THE SURGERY.  TO RELIEVE THIS TYPE OF PAIN, YOU MAY TAKE TYLENOL (ACETAMINOPHEN) OR ADVIL (IBUPROFEN).  FOLLOW THE INSTRUCTIONS LISTED ON THE LABEL.  DRINK A FULL GLASS OF WATER WITH EACH DOSE.  YOU CAN ALSO TRY LYING FLAT OR RAISING YOUR HIPS ABOVE SHOULDER LEVEL.    BATHING  YOU MAY SHOWER OR BATHE AT  ANY TIME.  IF YOU TAKE A BATH, DON'T ADD ANYTHING TO THE BATH WATER.    CARING FOR YOUR STITCHES  YOUR BODY WILL ABSORB YOUR STITCHES OVER TIME.  KEEP THEM CLEAN AND DRY.  WEAR LOOSE, COMFORTABLE CLOTHES.    NORMAL SYMPTOMS  YOU MAY NOTICE A SMALL AMOUNT OF BLEEDING FROM YOUR VAGINA.  THIS COULD LAST UP TO A WEEK.  YOU MAY ALSO HAVE BROWN FLUID LEAKING FROM THE VAGINA.  THIS COULD LAST FOR A FEW WEEKS.  WEAR PADS AS NEEDED.    YOU MAY HAVE BRUISING ON YOUR BELLY.  YOU MIGHT ALSO HAVE A PUFFY FEELING IN YOUR BELLY.    YOU MAY SEE A LITTLE BLOOD OR FLUID OOZING FROM THE INCISION.    HORMONES  IF WE REMOVED YOUR OVARIES, YOU MAY NEED HORMONE MEDICINE (HT, OR HORMONE THERAPY).  DISCUSS THIS WITH YOUR DOCTOR.  IF WE DID NOT REMOVE YOUR OVARIES, YOU SHOULD REACH MENOPAUSE AT THE NORMAL TIME (IN GENERAL, BETWEEN AGES 40 AND 55).    CALL YOUR DOCTOR IF YOU HAVE:  SEVERE CHILLS AND FEVER .4 DEGREES FAHRENHEIT OR HIGHER, TAKEN UNDER THE TONGUE.   BRIGHT RED BLOOD OR LARGE CLOTS COMING OUT OF THE VAGINA--ENOUGH TO SOAK ONE PAD IN AN HOUR.  INCREASED PAIN, WARMTH, SWELLING, REDNESS, BLEEDING OR FLUID LEAKING FROM THE SURGERY SITE.  URINE OR VAGINAL FLUID THAT SMELLS BAD.  YOU CANNOT URINATE (USE THE TOILET), IT BURNS WHEN YOU URINATE OR YOU NEED TO GO MORE OFTEN.  SEVERE NAUSEA (FEELING SICK TO YOUR STOMACH) OR VOMITING (THROWING UP).  INCREASED PAIN THAT YOU CANNOT CONTROL WITH MEDICINE.      Maximum acetaminophen (Tylenol) dose from all sources should not exceed 4 grams (4000 mg) per day.  You received 975 mg of tylenol today at 7:10 am

## 2019-09-03 NOTE — ANESTHESIA PREPROCEDURE EVALUATION
"Anesthesia Pre-Procedure Evaluation    Patient: Cheryl Lester   MRN: [id number] : 1975          Preoperative Diagnosis: Uterine fibroids    Procedure(s):  Laparoscopic supracervical hysterectomy, bilateral salpingectomy with power morcellation in an isolation bag and possible laparotomy  LAPAROTOMY, EXPLORATORY    History reviewed. No pertinent past medical history.  Past Surgical History:   Procedure Laterality Date     GYN SURGERY      laparoscopy     Anesthesia Evaluation     .             ROS/MED HX    ENT/Pulmonary:  - neg pulmonary ROS    (-) asthma   Neurologic:  - neg neurologic ROS    (-) seizures and Neuropathy   Cardiovascular:  - neg cardiovascular ROS      (-) CAD, syncope and arrhythmias   METS/Exercise Tolerance:  >4 METS   Hematologic:  - neg hematologic  ROS       Musculoskeletal:  - neg musculoskeletal ROS       GI/Hepatic:  - neg GI/hepatic ROS      (-) GERD   Renal/Genitourinary:  - ROS Renal section negative       Endo:  - neg endo ROS       Psychiatric:  - neg psychiatric ROS       Infectious Disease:  - neg infectious disease ROS       Malignancy:         Other:                          Physical Exam  Normal systems: cardiovascular, pulmonary and dental    Airway   Mallampati: I  TM distance: >3 FB  Neck ROM: full    Dental     Cardiovascular       Pulmonary             Lab Results   Component Value Date    HCG Negative 2019       Preop Vitals  BP Readings from Last 3 Encounters:   19 114/76   17 122/84    Pulse Readings from Last 3 Encounters:   19 75   17 100      Resp Readings from Last 3 Encounters:   19 18   17 16    SpO2 Readings from Last 3 Encounters:   19 98%   17 100%      Temp Readings from Last 1 Encounters:   19 98.2  F (36.8  C) (Temporal)    Ht Readings from Last 1 Encounters:   19 1.626 m (5' 4\")      Wt Readings from Last 1 Encounters:   19 81.6 kg (180 lb)    Estimated body mass index is 30.9 kg/m  as " "calculated from the following:    Height as of this encounter: 1.626 m (5' 4\").    Weight as of this encounter: 81.6 kg (180 lb).       Anesthesia Plan      History & Physical Review  History and physical reviewed and following examination; no interval change.    ASA Status:  1 .    NPO Status:  > 8 hours    Plan for General and ETT (LTA) with Intravenous induction. Maintenance will be Balanced.    PONV prophylaxis:  Ondansetron (or other 5HT-3) and Dexamethasone or Solumedrol       Postoperative Care  Postoperative pain management:  IV analgesics, Multi-modal analgesia and Oral pain medications.      Consents  Anesthetic plan, risks, benefits and alternatives discussed with:  Patient..                 Kenroy Carrera MD                    .  "

## 2019-09-03 NOTE — OP NOTE
Procedure Date: 09/03/2019      PREOPERATIVE DIAGNOSIS:  Symptomatic uterine fibroids and right ovarian lesion.      POSTOPERATIVE DIAGNOSIS:  Symptomatic uterine fibroids, right ovarian lesion, and enterocele.      PROCEDURE:  LESS, total laparoscopic hysterectomy, bilateral salpingectomy, right oophorectomy, apical colpopexy, contained morcellation within the PneumoLiner.      SURGEON:  Gustavo Lewis MD.      FIRST ASSISTANT:  Lisa Walters MD.      ANESTHESIA:  General.      BLOOD LOSS:  200 mL.      COMPLICATIONS:  None.      INDICATIONS AND CONSENT:  This patient is a 44-year-old with very large growing symptomatic uterine fibroids that she has had for many years.  She is a patient of Dr. Walters and she had preoperative consent by Dr. Lisa Walters.      DESCRIPTION OF PROCEDURE:  After informed consent was obtained, patient was taken to the operating room where general anesthesia was administered.  She was placed in dorsal lithotomy position with the arms tucked.  The abdomen, perineum and vagina were prepped and draped in the usual sterile fashion.  Carlson catheter was sterilely inserted.  The cervix was displaced and not able to be instrumented and a sponge stick was placed into the vagina.  Attention was turned to the abdomen where a U-shaped incision was made in the fold of the umbilicus, carried out in layers to the fascia, which was sharply incised and the peritoneal cavity sharply entered.  Olympus TriPort was placed and an examination revealed a very large anterior uterine fibroid, retroperitoneally involved to the anterior retroperitoneum bilaterally.  The uterus is displaced to the left and posterior.  The ovaries and fallopian tubes did appear normal.  Pelvic washings were obtained and sent for permanent pathologic evaluation.  The anterior retroperitoneum was then opened.  Round ligaments were able to be dissected and lysed gaining mobility and the utero-ovarian pedicles were able to be clamped, cauterized  and cut.  Careful dissection of the fibroid was then carried out completely anterior.  The bladder was sharply taken down and this was an extensive retroperitoneal dissection.  The broad ligaments were able to be better delineated, clamped, cauterized and cut with the Thunderbeat and there was good hemostasis.  The uterine artery pedicles were able to be isolated, clamped, cauterized and cut bilaterally with the Thunderbeat.  The additional attempt at instrumenting the cervix was unsuccessful and the sponge stick was replaced.  A colpotomy was performed over the sponge stick and the intact uterus and cervix were placed in the right upper quadrant.  The pelvis was inspected and irrigated and the vaginal cuff was then closed with 0 Vicryl interrupted sutures.  There was an enterocele and the uterosacral ligaments were clearly identified.  The ureters were seen coursing normally and had been carefully retroperitoneally dissected.  The uterosacral ligaments were plicated with 2-0 Ethibond completing the apical colpopexy and the pelvis was irrigated and noted to be nicely hemostatic.  Attention was then turned to the left fallopian tube where the mesosalpinx was clamped, cauterized and cut and the fallopian tube was removed from the port.  The right fallopian tube and ovary were then isolated from the ureter.  The infundibulopelvic vessels were clamped, cauterized and cut with the Thunderbeat and the ovary was removed through the port.  There was bleeding from the right ovarian pedicle and hemostasis was achieved with Hemoclips.  The pelvis was then again carefully irrigated and inspected.  There was a large peritoneal defect from the fibroid.  Pressures were lowered and hemostasis was confirmed with additional Hemoclip being placed on the left uterine artery.  The pelvis was then copiously irrigated and again inspected and noted to be hemostatic.  Skyla was placed on all the raw peritoneal surfaces.  The port was  removed.  The Olympus PneumoLiner port was placed.  The PneumoLiner was placed within the abdomen.  The uterus was maneuvered into the PneumoLiner bag and the bag was exteriorized.  The port cap was replaced and artificial pneumoperitoneum was obtained.  The camera was then placed and the specimen was confirmed completely within the artificial pneumoperitoneum in the PneumoLiner.  Using the Sophia morcellator under direct visualization at all times, an extensive morcellation was carried out of this very large uterus.  Residual fragments and fluids were then left in the bag and the bag was removed.  It was inspected and found to be intact and confirmed by positive pressure test.  The port was removed.  The fascia was closed with 2-0 PDS, the skin with 4-0 Monocryl.      SPECIMEN:  Uterus, cervix, bilateral fallopian tubes, right ovary, pelvic washings.  The total blood loss for the entirety of the procedure was estimated at 200 mL and there were no complications of the procedure.         ASHLIE COELLO MD             D: 2019   T: 2019   MT: NIMA      Name:     ALFA HITCHCOCK   MRN:      [id number]        Account:        [id number]   :      1975           Procedure Date: 2019      Document: [id number]

## 2019-09-04 VITALS
DIASTOLIC BLOOD PRESSURE: 73 MMHG | SYSTOLIC BLOOD PRESSURE: 116 MMHG | WEIGHT: 180 LBS | TEMPERATURE: 97.2 F | HEART RATE: 85 BPM | HEIGHT: 64 IN | BODY MASS INDEX: 30.73 KG/M2 | RESPIRATION RATE: 16 BRPM | OXYGEN SATURATION: 97 %

## 2019-09-04 LAB
COPATH REPORT: NORMAL
COPATH REPORT: NORMAL
GLUCOSE BLDC GLUCOMTR-MCNC: 150 MG/DL (ref 70–99)
HGB BLD-MCNC: 9.8 G/DL (ref 11.7–15.7)

## 2019-09-04 PROCEDURE — 85018 HEMOGLOBIN: CPT | Performed by: OBSTETRICS & GYNECOLOGY

## 2019-09-04 PROCEDURE — 25000132 ZZH RX MED GY IP 250 OP 250 PS 637: Performed by: OBSTETRICS & GYNECOLOGY

## 2019-09-04 PROCEDURE — 25800030 ZZH RX IP 258 OP 636: Performed by: OBSTETRICS & GYNECOLOGY

## 2019-09-04 PROCEDURE — 25000128 H RX IP 250 OP 636: Performed by: OBSTETRICS & GYNECOLOGY

## 2019-09-04 PROCEDURE — 82962 GLUCOSE BLOOD TEST: CPT

## 2019-09-04 PROCEDURE — 36415 COLL VENOUS BLD VENIPUNCTURE: CPT | Performed by: OBSTETRICS & GYNECOLOGY

## 2019-09-04 RX ORDER — AMOXICILLIN 250 MG
1 CAPSULE ORAL 2 TIMES DAILY
Qty: 30 TABLET | Refills: 1 | Status: SHIPPED | OUTPATIENT
Start: 2019-09-04

## 2019-09-04 RX ORDER — OXYCODONE HYDROCHLORIDE 5 MG/1
5-10 TABLET ORAL
Qty: 12 TABLET | Refills: 0 | Status: SHIPPED | OUTPATIENT
Start: 2019-09-04

## 2019-09-04 RX ORDER — FERROUS SULFATE 325(65) MG
325 TABLET, DELAYED RELEASE (ENTERIC COATED) ORAL DAILY
Qty: 30 TABLET | Refills: 1 | Status: SHIPPED | OUTPATIENT
Start: 2019-09-04

## 2019-09-04 RX ORDER — IBUPROFEN 600 MG/1
600 TABLET, FILM COATED ORAL EVERY 6 HOURS PRN
Qty: 30 TABLET | Refills: 1 | Status: SHIPPED | OUTPATIENT
Start: 2019-09-04

## 2019-09-04 RX ORDER — OXYCODONE HYDROCHLORIDE 5 MG/1
5-10 TABLET ORAL
Qty: 20 TABLET | Refills: 0 | Status: SHIPPED | OUTPATIENT
Start: 2019-09-04

## 2019-09-04 RX ORDER — ACETAMINOPHEN 325 MG/1
650 TABLET ORAL EVERY 6 HOURS PRN
Qty: 1 BOTTLE | Refills: 0 | Status: SHIPPED | OUTPATIENT
Start: 2019-09-04

## 2019-09-04 RX ADMIN — KETOROLAC TROMETHAMINE 30 MG: 30 INJECTION, SOLUTION INTRAMUSCULAR at 03:45

## 2019-09-04 RX ADMIN — OXYCODONE HYDROCHLORIDE 5 MG: 5 TABLET ORAL at 10:55

## 2019-09-04 RX ADMIN — ACETAMINOPHEN 650 MG: 325 TABLET ORAL at 07:48

## 2019-09-04 RX ADMIN — RANITIDINE 150 MG: 150 TABLET ORAL at 07:48

## 2019-09-04 RX ADMIN — SODIUM CHLORIDE, POTASSIUM CHLORIDE, SODIUM LACTATE AND CALCIUM CHLORIDE: 600; 310; 30; 20 INJECTION, SOLUTION INTRAVENOUS at 05:21

## 2019-09-04 RX ADMIN — KETOROLAC TROMETHAMINE 30 MG: 30 INJECTION, SOLUTION INTRAMUSCULAR at 10:12

## 2019-09-04 NOTE — PLAN OF CARE
PRIMARY DIAGNOSIS: Laparoscopic single port  hysterectomy, bilateral salpingectomy, right oopherectomy  OUTPATIENT/OBSERVATION GOALS TO BE MET BEFORE DISCHARGE:  1. Stable vital signs Yes  2. Tolerating diet:Yes  3. Pain controlled with oral pain medications:  No, IV Toradol  4. Positive bowel sounds:  Yes  5. Voiding without difficulty:  Yes  6. Able to ambulate:  Yes  7. Provider specific discharge goals met:  yes     Patient is alert and oriented x4. VSS. Mild pain at incision, ice pack given. Pt requesting Tylenol after breakfast. LR running 100 mL/hr. Diet advanced to regular for breakfast, tolerating fulls. Denies eye pain. Voiding without difficulty. SBA in room.       Discharge Planner Nurse   Safe discharge environment identified: Yes  Barriers to discharge: No

## 2019-09-04 NOTE — PLAN OF CARE
PRIMARY DIAGNOSIS: Laparoscopic single port  hysterectomy, bilateral salpingectomy, right oopherectomy  OUTPATIENT/OBSERVATION GOALS TO BE MET BEFORE DISCHARGE:  1. Stable vital signs Yes  2. Tolerating diet:Yes  3. Pain controlled with oral pain medications:  No  4. Positive bowel sounds:  Yes, Hypoactive   5. Voiding without difficulty:  Yes  6. Able to ambulate:  Yes  7. Provider specific discharge goals met:  No    Patient is alert and oriented x4. VSS. Rating pain at incision 5 out of 10. Scheduled Toradol given. LR running 100 mL/hr. Clear liquid diet tolerated. Complains of eye pain but has decreased since earlier, Anesthesiology aware. Voiding without difficulty. SBA in room. Will continue to monitor.     Discharge Planner Nurse   Safe discharge environment identified: Yes  Barriers to discharge: No       Entered by: Lizette Garay 09/04/2019 12:59 AM     Please review provider order for any additional goals.   Nurse to notify provider when observation goals have been met and patient is ready for discharge.

## 2019-09-04 NOTE — PROGRESS NOTES
Patient's After Visit Summary was reviewed with patient and .   Patient verbalized understanding of After Visit Summary, recommended follow up and was given an opportunity to ask questions.   Discharge medications sent home with patient/family: YES   Discharged with other:friend

## 2019-09-04 NOTE — PLAN OF CARE
PRIMARY DIAGNOSIS: Laparoscopic single port  hysterectomy, bilateral salpingectomy, right oopherectomy  OUTPATIENT/OBSERVATION GOALS TO BE MET BEFORE DISCHARGE:  1. Stable vital signs Yes  2. Tolerating diet:Yes  3. Pain controlled with oral pain medications:  No  4. Positive bowel sounds:  Yes, Hypoactive   5. Voiding without difficulty:  Yes  6. Able to ambulate:  Yes  7. Provider specific discharge goals met:  No    Patient is alert and oriented x4. VSS. Rating pain at incision 5 out of 10. Scheduled Toradol given. LR running 100 mL/hr. Clear liquid diet tolerated. Complains of eye pain but has decreased since earlier, Anesthesiology aware. Voiding without difficulty. SBA in room. Will continue to monitor.     Discharge Planner Nurse   Safe discharge environment identified: Yes  Barriers to discharge: No       Entered by: Lizette Garay 09/04/2019 4:25 AM     Please review provider order for any additional goals.   Nurse to notify provider when observation goals have been met and patient is ready for discharge.

## 2019-09-04 NOTE — PROGRESS NOTES
Resting comfortably in bed. Tolerating diet.    vss afebrile  Abdomen soft and non tender  Incision dry and intact steri strips stained with dried blood but in place  Extremities pneumoboots on    hgb = 9.8  Was 12.7 pre op    A: POD 1 s/p [id number] morcellation of 18 week size uterus      Anemia due to acute blood loss      Doing well  P: discharge home      Pain meds/Fe      RTC 2 weeks and 6 weeks

## 2019-09-04 NOTE — PLAN OF CARE
PRIMARY DIAGNOSIS: Laparoscopic single port  hysterectomy, bilateral salpingectomy, right oopherectomy  OUTPATIENT/OBSERVATION GOALS TO BE MET BEFORE DISCHARGE:  1. Stable vital signs Yes  2. Tolerating diet:Yes  3. Pain controlled with oral pain medications:  No  4. Positive bowel sounds:  Yes, Hypoactive   5. Voiding without difficulty:  Yes  6. Able to ambulate:  Yes  7. Provider specific discharge goals met:  No    Patient is alert and oriented x4. VSS. Rating pain at incision 5 out of 10. Scheduled Toradol given. LR running 100 mL/hr. Clear liquid diet tolerated. Complains of eye pain, Anesthesiology aware. Voiding without difficulty. SBA in room. Will continue to monitor.     Discharge Planner Nurse   Safe discharge environment identified: Yes  Barriers to discharge: No       Entered by: Lizette Garay 09/03/2019 10:20 PM     Please review provider order for any additional goals.   Nurse to notify provider when observation goals have been met and patient is ready for discharge.

## 2022-06-16 ENCOUNTER — HOSPITAL ENCOUNTER (EMERGENCY)
Facility: CLINIC | Age: 47
Discharge: HOME | End: 2022-06-17
Attending: EMERGENCY MEDICINE | Admitting: EMERGENCY MEDICINE

## 2022-06-16 ENCOUNTER — APPOINTMENT (OUTPATIENT)
Dept: CT IMAGING | Facility: CLINIC | Age: 47
End: 2022-06-16
Attending: EMERGENCY MEDICINE

## 2022-06-16 DIAGNOSIS — R06.00 DYSPNEA, UNSPECIFIED TYPE: ICD-10-CM

## 2022-06-16 DIAGNOSIS — R53.83 OTHER FATIGUE: ICD-10-CM

## 2022-06-16 LAB
ANION GAP SERPL CALCULATED.3IONS-SCNC: 7 MMOL/L (ref 3–14)
BASOPHILS # BLD AUTO: 0 10E3/UL (ref 0–0.2)
BASOPHILS NFR BLD AUTO: 0 %
BUN SERPL-MCNC: 16 MG/DL (ref 7–30)
CALCIUM SERPL-MCNC: 9.7 MG/DL (ref 8.5–10.1)
CHLORIDE BLD-SCNC: 103 MMOL/L (ref 94–109)
CO2 SERPL-SCNC: 30 MMOL/L (ref 20–32)
CREAT SERPL-MCNC: 0.42 MG/DL (ref 0.52–1.04)
D DIMER PPP FEU-MCNC: 0.65 UG/ML FEU (ref 0–0.5)
EOSINOPHIL # BLD AUTO: 0.1 10E3/UL (ref 0–0.7)
EOSINOPHIL NFR BLD AUTO: 1 %
ERYTHROCYTE [DISTWIDTH] IN BLOOD BY AUTOMATED COUNT: 11.7 % (ref 10–15)
FLUAV RNA SPEC QL NAA+PROBE: NEGATIVE
FLUBV RNA RESP QL NAA+PROBE: NEGATIVE
GFR SERPL CREATININE-BSD FRML MDRD: >90 ML/MIN/1.73M2
GLUCOSE BLD-MCNC: 120 MG/DL (ref 70–99)
HCT VFR BLD AUTO: 40.8 % (ref 35–47)
HGB BLD-MCNC: 13 G/DL (ref 11.7–15.7)
HOLD SPECIMEN: NORMAL
HOLD SPECIMEN: NORMAL
IMM GRANULOCYTES # BLD: 0 10E3/UL
IMM GRANULOCYTES NFR BLD: 0 %
LYMPHOCYTES # BLD AUTO: 2 10E3/UL (ref 0.8–5.3)
LYMPHOCYTES NFR BLD AUTO: 34 %
MCH RBC QN AUTO: 28.2 PG (ref 26.5–33)
MCHC RBC AUTO-ENTMCNC: 31.9 G/DL (ref 31.5–36.5)
MCV RBC AUTO: 89 FL (ref 78–100)
MONOCYTES # BLD AUTO: 0.5 10E3/UL (ref 0–1.3)
MONOCYTES NFR BLD AUTO: 9 %
NEUTROPHILS # BLD AUTO: 3.2 10E3/UL (ref 1.6–8.3)
NEUTROPHILS NFR BLD AUTO: 56 %
NRBC # BLD AUTO: 0 10E3/UL
NRBC BLD AUTO-RTO: 0 /100
PLATELET # BLD AUTO: 289 10E3/UL (ref 150–450)
POTASSIUM BLD-SCNC: 4.2 MMOL/L (ref 3.4–5.3)
RBC # BLD AUTO: 4.61 10E6/UL (ref 3.8–5.2)
RSV RNA SPEC NAA+PROBE: NEGATIVE
SARS-COV-2 RNA RESP QL NAA+PROBE: NEGATIVE
SODIUM SERPL-SCNC: 140 MMOL/L (ref 133–144)
TROPONIN I SERPL HS-MCNC: 7 NG/L
WBC # BLD AUTO: 5.8 10E3/UL (ref 4–11)

## 2022-06-16 PROCEDURE — 80048 BASIC METABOLIC PNL TOTAL CA: CPT | Performed by: EMERGENCY MEDICINE

## 2022-06-16 PROCEDURE — 84484 ASSAY OF TROPONIN QUANT: CPT | Performed by: EMERGENCY MEDICINE

## 2022-06-16 PROCEDURE — 71275 CT ANGIOGRAPHY CHEST: CPT

## 2022-06-16 PROCEDURE — 93005 ELECTROCARDIOGRAM TRACING: CPT | Mod: 76

## 2022-06-16 PROCEDURE — 85025 COMPLETE CBC W/AUTO DIFF WBC: CPT | Performed by: EMERGENCY MEDICINE

## 2022-06-16 PROCEDURE — 85379 FIBRIN DEGRADATION QUANT: CPT | Performed by: EMERGENCY MEDICINE

## 2022-06-16 PROCEDURE — 93005 ELECTROCARDIOGRAM TRACING: CPT

## 2022-06-16 PROCEDURE — 99285 EMERGENCY DEPT VISIT HI MDM: CPT | Mod: CS,25

## 2022-06-16 PROCEDURE — C9803 HOPD COVID-19 SPEC COLLECT: HCPCS

## 2022-06-16 PROCEDURE — 36415 COLL VENOUS BLD VENIPUNCTURE: CPT | Performed by: EMERGENCY MEDICINE

## 2022-06-16 PROCEDURE — 87637 SARSCOV2&INF A&B&RSV AMP PRB: CPT | Performed by: EMERGENCY MEDICINE

## 2022-06-16 RX ORDER — IOPAMIDOL 755 MG/ML
500 INJECTION, SOLUTION INTRAVASCULAR ONCE
Status: COMPLETED | OUTPATIENT
Start: 2022-06-16 | End: 2022-06-17

## 2022-06-16 ASSESSMENT — ENCOUNTER SYMPTOMS
SHORTNESS OF BREATH: 1
DYSURIA: 0
MYALGIAS: 1
COUGH: 0
NAUSEA: 0
DIARRHEA: 0
FEVER: 0
ABDOMINAL PAIN: 0
FATIGUE: 1
VOMITING: 0

## 2022-06-17 VITALS
OXYGEN SATURATION: 100 % | BODY MASS INDEX: 30.73 KG/M2 | HEART RATE: 94 BPM | RESPIRATION RATE: 16 BRPM | WEIGHT: 179 LBS | TEMPERATURE: 98.1 F | DIASTOLIC BLOOD PRESSURE: 67 MMHG | SYSTOLIC BLOOD PRESSURE: 145 MMHG

## 2022-06-17 LAB
ATRIAL RATE - MUSE: 83 BPM
ATRIAL RATE - MUSE: 86 BPM
DIASTOLIC BLOOD PRESSURE - MUSE: NORMAL MMHG
DIASTOLIC BLOOD PRESSURE - MUSE: NORMAL MMHG
INTERPRETATION ECG - MUSE: NORMAL
INTERPRETATION ECG - MUSE: NORMAL
P AXIS - MUSE: 44 DEGREES
P AXIS - MUSE: 46 DEGREES
PR INTERVAL - MUSE: 166 MS
PR INTERVAL - MUSE: 168 MS
QRS DURATION - MUSE: 78 MS
QRS DURATION - MUSE: 84 MS
QT - MUSE: 390 MS
QT - MUSE: 394 MS
QTC - MUSE: 458 MS
QTC - MUSE: 471 MS
R AXIS - MUSE: 26 DEGREES
R AXIS - MUSE: 28 DEGREES
SYSTOLIC BLOOD PRESSURE - MUSE: NORMAL MMHG
SYSTOLIC BLOOD PRESSURE - MUSE: NORMAL MMHG
T AXIS - MUSE: 36 DEGREES
T AXIS - MUSE: 37 DEGREES
VENTRICULAR RATE- MUSE: 83 BPM
VENTRICULAR RATE- MUSE: 86 BPM

## 2022-06-17 PROCEDURE — 250N000011 HC RX IP 250 OP 636: Performed by: EMERGENCY MEDICINE

## 2022-06-17 RX ADMIN — IOPAMIDOL 56 ML: 755 INJECTION, SOLUTION INTRAVENOUS at 00:02

## 2022-06-17 NOTE — ED TRIAGE NOTES
SOB that worsens with exertion. Pt also c/o fatigue, body aches and dizziness. Denies CP. Pt has been vaccinated against covid 19 and denies exposure. Denies BLE edema. ABC in tact. A/Ox4

## 2022-06-17 NOTE — ED PROVIDER NOTES
History   Chief Complaint:  Shortness of Breath     The history is provided by the patient. The history is limited by a language barrier. A  was used (friend - [place]ian).      Cheryl Lester is an otherwise healthy 46 year old female who presents with a week of shortness of breath that worsens with exertion. The patient reports feeling shaky and fatigued as well. She had muscle cramps a few days ago though this has subsided. No fever, cough, chest pain, nausea, vomiting, diarrhea, abdominal pain, dysuria, or vaginal bleeding. She has been vaccinated x3 against COVID and denies exposure to sick contacts. She denies recent travel and does not take birth control or hormones. No alcohol or tobacco use.     Review of Systems   Constitutional: Positive for fatigue. Negative for fever.   Respiratory: Positive for shortness of breath. Negative for cough.    Cardiovascular: Negative for chest pain.   Gastrointestinal: Negative for abdominal pain, diarrhea, nausea and vomiting.   Genitourinary: Negative for dysuria and vaginal bleeding.   Musculoskeletal: Positive for myalgias.   All other systems reviewed and are negative.    Allergies:  Gabapentin    Medications:  The patient is not currently taking any daily medications.     Past Medical History:     The patient denies past medical history.     Past Surgical History:    Laparoscopy  Hysterectomy and bilateral salpingectomy     Family History:    CAD, mother/sister  Hypertension, mother    Social History:  The patient presents to the ED with friend.  Alcohol Use: No  Tobacco Use: No    Physical Exam     Patient Vitals for the past 24 hrs:   BP Temp Temp src Pulse Resp SpO2 Weight   06/16/22 2108 133/73 97.6  F (36.4  C) Temporal 85 16 100 % 81.2 kg (179 lb)       Physical Exam  Nursing note and vitals reviewed.  Constitutional: Well nourished. Resting comfortably.   Eyes: Conjunctiva normal.  Pupils are equal, round, and reactive to light.   ENT: Nose  normal. Mucous membranes pink and moist.    Neck: Normal range of motion.  CVS: Normal rate, regular rhythm.  Normal heart sounds.  No murmur.  Pulmonary: Lungs clear to auscultation bilaterally. No wheezes/rales/rhonchi.  GI: Abdomen soft. Nontender, nondistended. No rigidity or guarding.    MSK: No calf tenderness or swelling.  Neuro: Alert. Follows simple commands.  Skin: Skin is warm and dry. No rash noted.   Psychiatric: Normal affect.       Emergency Department Course   ECG  ECG results from 06/16/22   EKG 12 lead     Value    Systolic Blood Pressure     Diastolic Blood Pressure     Ventricular Rate 83    Atrial Rate 83    DC Interval 168    QRS Duration 78        QTc 458    P Axis 46    R AXIS 28    T Axis 36    Interpretation ECG      Sinus rhythm  Normal ECG  When compared with ECG of 20-JAN-2017 16:47,  T wave amplitude has increased in Anterior leads     EKG 12 lead     Value    Systolic Blood Pressure     Diastolic Blood Pressure     Ventricular Rate 86    Atrial Rate 86    DC Interval 166    QRS Duration 84        QTc 471    P Axis 44    R AXIS 26    T Axis 37    Interpretation ECG      Sinus rhythm  Normal ECG  When compared with ECG of 16-JUN-2022 21:26, (unconfirmed)  No significant change was found         Imaging:  CT Chest Pulmonary Embolism w Contrast   Final Result   IMPRESSION:   1.  No acute findings in the chest. No acute pulmonary embolism, within limitations of motion artifact.        Report per radiology    Laboratory:  Labs Ordered and Resulted from Time of ED Arrival to Time of ED Departure   BASIC METABOLIC PANEL - Abnormal       Result Value    Sodium 140      Potassium 4.2      Chloride 103      Carbon Dioxide (CO2) 30      Anion Gap 7      Urea Nitrogen 16      Creatinine 0.42 (*)     Calcium 9.7      Glucose 120 (*)     GFR Estimate >90     D DIMER QUANTITATIVE - Abnormal    D-Dimer Quantitative 0.65 (*)    TROPONIN I - Normal    Troponin I High Sensitivity 7      INFLUENZA A/B & SARS-COV2 PCR MULTIPLEX - Normal    Influenza A PCR Negative      Influenza B PCR Negative      RSV PCR Negative      SARS CoV2 PCR Negative     CBC WITH PLATELETS AND DIFFERENTIAL    WBC Count 5.8      RBC Count 4.61      Hemoglobin 13.0      Hematocrit 40.8      MCV 89      MCH 28.2      MCHC 31.9      RDW 11.7      Platelet Count 289      % Neutrophils 56      % Lymphocytes 34      % Monocytes 9      % Eosinophils 1      % Basophils 0      % Immature Granulocytes 0      NRBCs per 100 WBC 0      Absolute Neutrophils 3.2      Absolute Lymphocytes 2.0      Absolute Monocytes 0.5      Absolute Eosinophils 0.1      Absolute Basophils 0.0      Absolute Immature Granulocytes 0.0      Absolute NRBCs 0.0        Emergency Department Course:     Reviewed:  I reviewed nursing notes, vitals, past medical history and Care Everywhere.    Assessments:  2212 I obtained history and examined the patient as noted above.   0029 I rechecked the patient and explained findings.     Disposition:  The patient was discharged to home.     Impression & Plan     Medical Decision Making:  Patient is a 46-year-old female presenting with predominant complaints of fatigue and dyspnea.  She is nontoxic, clinically well-hydrated, in no significant distress.  EKG without focal ischemia or underlying arrhythmia.  High-sensitivity screening troponin negative.  She denies any active chest pain and I have a low clinical suspicion for ACS.  No profound anemia or significant electrolyte derangements on lab work.  No chance of pregnancy as patient has had a hysterectomy.  She tested negative for COVID-19/influenza during her time in the ED.  Her D-dimer was elevated so she did undergo formal CT which is fortunately without evidence of PE, pneumonia, pneumothorax or other acute pulmonary etiology.  She ambulates without hypoxia or significant work of breathing.  I did discuss with patient I do not feel further emergent work-up is warranted  at this point in time.  I did recommend close outpatient PCP follow-up as additional testing may be warranted in the outpatient setting.  Primary care referral provided today.  Patient agreeable to plan of care, all questions addressed.    Diagnosis:    ICD-10-CM    1. Chest pain, unspecified type  R07.9      Scribe Disclosure:  I, Teresa Stauffer, am serving as a scribe at 10:03 PM on 6/16/2022 to document services personally performed by Alicia Cerda DO based on my observations and the provider's statements to me.      Alicia Cerda DO  06/17/22 0119

## 2023-01-23 ENCOUNTER — HEALTH MAINTENANCE LETTER (OUTPATIENT)
Age: 48
End: 2023-01-23

## 2024-02-24 ENCOUNTER — HEALTH MAINTENANCE LETTER (OUTPATIENT)
Age: 49
End: 2024-02-24

## 2025-03-09 ENCOUNTER — HEALTH MAINTENANCE LETTER (OUTPATIENT)
Age: 50
End: 2025-03-09

## (undated) DEVICE — ESU GROUND PAD ADULT W/CORD E7507

## (undated) DEVICE — SOL NACL 0.9% IRRIG 1000ML BOTTLE 2F7124

## (undated) DEVICE — PREP POVIDONE IODINE SCRUB 7.5% 4OZ APL82212

## (undated) DEVICE — PACK LAP HYST RIDGES

## (undated) DEVICE — SU VICRYL 0 CT-1 36" J346H

## (undated) DEVICE — TROCAR TRIPORT PLUS OLYMPUS SINGLE ACCESS WA58010T

## (undated) DEVICE — Device

## (undated) DEVICE — CLIP APPLIER ENDO 5MM M/L LIGAMAX EL5ML

## (undated) DEVICE — ENDO MORCELLATOR OLYMPUS PNEUMOLINER WA90500US

## (undated) DEVICE — GLOVE PROTEXIS W/NEU-THERA 7.0  2D73TE70

## (undated) DEVICE — SU PDS II 2-0 CT-2 27"  Z333H

## (undated) DEVICE — DRAPE SLEEVE 599

## (undated) DEVICE — ESU PENCIL W/HOLSTER E2350H

## (undated) DEVICE — GLOVE PROTEXIS MICRO 6.5  2D73PM65

## (undated) DEVICE — SU VICRYL 3-0 CT-1 36" J344H

## (undated) DEVICE — LINEN POUCH DBL 5427

## (undated) DEVICE — SUCTION CANISTER MEDIVAC LINER 3000ML W/LID 65651-530

## (undated) DEVICE — SOL NACL 0.9% IRRIG 3000ML BAG 2B7477

## (undated) DEVICE — LINEN HALF SHEET 5512

## (undated) DEVICE — PROTECTOR ARM ONE-STEP TRENDELENBURG 40418

## (undated) DEVICE — PAD CHUX UNDERPAD 30X36" P3036C

## (undated) DEVICE — BAG CLEAR TRASH 1.3M 39X33" P4040C

## (undated) DEVICE — LIGHT HANDLE X2

## (undated) DEVICE — EVAC SYSTEM CLEAR FLOW SC082500

## (undated) DEVICE — MORCELLATOR LAPAROSCOPIC LINA XCISE MOR-1515-6

## (undated) DEVICE — GOWN IMPERVIOUS SPECIALTY XLG/XLONG 32474

## (undated) DEVICE — GLOVE PROTEXIS W/NEU-THERA 6.5  2D73TE65

## (undated) DEVICE — ESU HANDPIECE BIPOLAR THUNDERBEAT FC 5MMX35CM TB-0535FC

## (undated) DEVICE — SU MONOCRYL 4-0 PS-2 27" UND Y426H

## (undated) DEVICE — SU PDS II 1 CT MONOFIL Z353H

## (undated) DEVICE — LINEN ORTHO ACL PACK 5447

## (undated) DEVICE — SU VICRYL 0 UR-6 27" J603H

## (undated) DEVICE — GLOVE PROTEXIS W/NEU-THERA 8.0  2D73TE80

## (undated) DEVICE — HEMOSTAT ABSORBABLE ARISTA 5GM SM0007-USA

## (undated) DEVICE — ENDO APPLICATOR ARISTA FLEX TIP AM0005

## (undated) DEVICE — GLOVE PROTEXIS W/NEU-THERA 7.5  2D73TE75

## (undated) DEVICE — SPONGE RAY-TEC 4X8" 7318

## (undated) DEVICE — DRSG BANDAID 1X3" FABRIC

## (undated) DEVICE — LINEN FULL SHEET 5511

## (undated) RX ORDER — FENTANYL CITRATE 50 UG/ML
INJECTION, SOLUTION INTRAMUSCULAR; INTRAVENOUS
Status: DISPENSED
Start: 2019-09-03

## (undated) RX ORDER — CEFAZOLIN SODIUM 2 G/100ML
INJECTION, SOLUTION INTRAVENOUS
Status: DISPENSED
Start: 2019-09-03

## (undated) RX ORDER — EPHEDRINE SULFATE 50 MG/ML
INJECTION, SOLUTION INTRAMUSCULAR; INTRAVENOUS; SUBCUTANEOUS
Status: DISPENSED
Start: 2019-09-03

## (undated) RX ORDER — PROPOFOL 10 MG/ML
INJECTION, EMULSION INTRAVENOUS
Status: DISPENSED
Start: 2019-09-03

## (undated) RX ORDER — CEFAZOLIN SODIUM 1 G/3ML
INJECTION, POWDER, FOR SOLUTION INTRAMUSCULAR; INTRAVENOUS
Status: DISPENSED
Start: 2019-09-03

## (undated) RX ORDER — ONDANSETRON 2 MG/ML
INJECTION INTRAMUSCULAR; INTRAVENOUS
Status: DISPENSED
Start: 2019-09-03

## (undated) RX ORDER — EPHEDRINE SULFATE 50 MG/ML
INJECTION, SOLUTION INTRAVENOUS
Status: DISPENSED
Start: 2019-09-03

## (undated) RX ORDER — MINERAL OIL
OIL (ML) MISCELLANEOUS
Status: DISPENSED
Start: 2019-09-03

## (undated) RX ORDER — LIDOCAINE HYDROCHLORIDE 10 MG/ML
INJECTION, SOLUTION EPIDURAL; INFILTRATION; INTRACAUDAL; PERINEURAL
Status: DISPENSED
Start: 2019-09-03

## (undated) RX ORDER — CELECOXIB 200 MG/1
CAPSULE ORAL
Status: DISPENSED
Start: 2019-09-03

## (undated) RX ORDER — KETAMINE HCL IN 0.9 % NACL 50 MG/5 ML
SYRINGE (ML) INTRAVENOUS
Status: DISPENSED
Start: 2019-09-03

## (undated) RX ORDER — BUPIVACAINE HYDROCHLORIDE AND EPINEPHRINE 2.5; 5 MG/ML; UG/ML
INJECTION, SOLUTION EPIDURAL; INFILTRATION; INTRACAUDAL; PERINEURAL
Status: DISPENSED
Start: 2019-09-03

## (undated) RX ORDER — PHENYLEPHRINE HCL IN 0.9% NACL 1 MG/10 ML
SYRINGE (ML) INTRAVENOUS
Status: DISPENSED
Start: 2019-09-03

## (undated) RX ORDER — ACETAMINOPHEN 325 MG/1
TABLET ORAL
Status: DISPENSED
Start: 2019-09-03

## (undated) RX ORDER — GLYCOPYRROLATE 0.2 MG/ML
INJECTION INTRAMUSCULAR; INTRAVENOUS
Status: DISPENSED
Start: 2019-09-03

## (undated) RX ORDER — NEOSTIGMINE METHYLSULFATE 1 MG/ML
VIAL (ML) INJECTION
Status: DISPENSED
Start: 2019-09-03

## (undated) RX ORDER — PROMETHAZINE HYDROCHLORIDE 25 MG/ML
INJECTION, SOLUTION INTRAMUSCULAR; INTRAVENOUS
Status: DISPENSED
Start: 2019-09-03

## (undated) RX ORDER — DEXAMETHASONE SODIUM PHOSPHATE 4 MG/ML
INJECTION, SOLUTION INTRA-ARTICULAR; INTRALESIONAL; INTRAMUSCULAR; INTRAVENOUS; SOFT TISSUE
Status: DISPENSED
Start: 2019-09-03